# Patient Record
Sex: FEMALE | Race: WHITE | NOT HISPANIC OR LATINO | Employment: OTHER | URBAN - METROPOLITAN AREA
[De-identification: names, ages, dates, MRNs, and addresses within clinical notes are randomized per-mention and may not be internally consistent; named-entity substitution may affect disease eponyms.]

---

## 2017-02-13 ENCOUNTER — TELEMEDICINE (OUTPATIENT)
Dept: TELEMEDICINE | Facility: OTHER | Age: 82
End: 2017-02-13
Payer: MEDICARE

## 2017-02-13 DIAGNOSIS — R47.01 APHASIA: ICD-10-CM

## 2017-02-13 DIAGNOSIS — I10 ESSENTIAL HYPERTENSION: ICD-10-CM

## 2017-02-13 DIAGNOSIS — G45.8 ACUTE CEREBROVASCULAR INSUFFICIENCY TRANSIENT FOCAL NEUROLOGIC DEFICIT: ICD-10-CM

## 2017-02-13 DIAGNOSIS — E78.2 MIXED HYPERLIPIDEMIA: ICD-10-CM

## 2017-02-13 PROCEDURE — G0426 INPT/ED TELECONSULT50: HCPCS | Mod: GT,,, | Performed by: PSYCHIATRY & NEUROLOGY

## 2017-02-13 NOTE — TELEMEDICINE CONSULT
[facility]Vascular Neurology  Tele-Consult    Consultation started: 2/13/2017 at 1:21 PM   Consulting Provider:  Trent  The patient location is [facility] Emergency Department  Spoke hospital nurse at bedside with patient assisting consultant.     Subjective:   Subjective   History of Present Illness:  Maria Del Carmen rBadford is a 83 y.o. female who presents with difficulty speaking and apparent disorientation. These symptoms have improved since witnessed by physical therapy at their house earlier today.    Therapist noted that she had some expressive aphasia at around 12 today.  last saw her normal at 11:30. Symptoms have been improving since witnessed initially. These symptoms have never happened before. There are no identified triggers or modifying factors. There have been no recurrent events. There are no other associated symptoms.    Wake up Stroke?: no  Last known normal:  1130  Recent bleeding noted: no  Does the patient take any Blood Thinners? no     H&P was obtained from patient.  Past Medical History: HTN, CAD w/ stents, HLD    Medications: No current outpatient prescriptions on file.    Allergies: Review of patient's allergies indicates:  Allergies not on file    Objective:     Vitals: There were no vitals filed for this visit.      Objective   Physical Exam:  General: well developed, well nourished   HENT: Head:normocephalic and atraumatic   HENT: Ears: right ear normal and left ear normal  Nose: normal nares and no discharge  Eyes:conjunctivae/corneas clear. PERRL.  Neck: normal, no obvious masses and trachea to midline  Lungs: normal respiratory effort  Cardiovascular: Heart: regular rate and rhythm   Cardiovascular: Extremities: no cyanosis, no edema and no clubbing  Abdomen: appears normal and not distended  Skin:  skin color and texture normal, no rash and no bruises  Musculoskeletal: normal range of motion in all extremities     Imaging Notes: Abnormal CT left subcortical frontal lobe  hypodensity, unclear subacute vs .remote infarct; no overwhelming evidence of EIC's. Impression performed at: 1335    NIH Stroke Scale:  Interval: baseline (upon arrival/admit)  Level of Consciousness: 0 - alert  LOC Questions: 0 - answers both correctly  LOC Commands: 0 - performs both correctly  Best Gaze: 0 - normal  Visual: 0 - no visual loss  Facial Palsy: 0 - normal  Motor Left Arm: 0 - no drift  Motor Right Arm: 0 - no drift  Motor Left Le - no drift  Motor Right Le - no drift  Limb Ataxia: 0 - absent  Sensory: 0 - normal  Best Language: 0 - no aphasia  Dysarthria: 0 - normal articulation  Extinction and Inattention: 0 - no neglect  NIH Stroke Scale Total: 0        Assessment - Diagnosis - Goals:     Plan     Diagnosis/Impression: transient ischemic attack / acute cerebrovascular insufficiency w/ rapid return to baseline function, improved per EMS and improved per ED and now on my exam seems to be back to baseline    TPA Recommendation: TPA not recommended due to back to baseline w/o deficit    Recommendation: NPO until after pass dysphagia screen. Diagnostic studies: HgbA1C to assess blood glucose levels, Lipid Profile to assess cholesterol levels, MRA head to assess vasculature, MRA neck/arch to assess vasculature, MRI head without contrast to assess brain parenchyma, Trans-thoracic cardiac echo to assess cardiac function/status  Consults: Rehab Consult; Occupational Therapy, Rehab Consult; Physical Therapy and Rehab Consult; Speech Therapy  Antithrombotics: Aspirin: 325 mg oral daily  Statins: Atorvastatin- 40 mg oral daily    Disposition Recommendation:  admit to inpatient       Possible Interventional Revascularization Candidate? No; No significant neurological deficit    Recommended the emergency room physician to have a brief discussion with the patient and/or family if available regarding the risks and benefits of treatment, and to briefly document the occurrence of that discussion in his  clinical encounter note.     The attending portion of this evaluation, treatment, and documentation was performed per Mariusz Ojeda via audiovisual.      Billing code:  (non-intervention mild to moderate stroke, TIA, some mimics)    · This patient has a critical neurological condition/illness, with some potential for high morbidity and mortality.  · There is a moderate probability for acute neurological change leading to clinical and possibly life-threatening deterioration requiring highest level of physician preparedness for urgent intervention.  · Care was coordinated with other physicians involved in the patient's care.  · Radiologic studies and laboratory data were reviewed and interpreted, and plan of care was re-assessed based on the results.  Diagnosis, treatment options and prognosis may have been discussed with the patient and/or family members or caregiver.      Consultation ended: 2/13/2017 at 1341    Glen Ojeda MD  Vascular Neurology